# Patient Record
(demographics unavailable — no encounter records)

---

## 2024-10-23 NOTE — HEALTH RISK ASSESSMENT
[Good] : ~his/her~  mood as  good [No] : No [No falls in past year] : Patient reported no falls in the past year [Little interest or pleasure doing things] : 1) Little interest or pleasure doing things [Feeling down, depressed, or hopeless] : 2) Feeling down, depressed, or hopeless [0] : 2) Feeling down, depressed, or hopeless: Not at all (0) [PHQ-2 Negative - No further assessment needed] : PHQ-2 Negative - No further assessment needed [[id number]] : 0 [Never] : Never [HIV test declined] : HIV test declined [Hepatitis C test declined] : Hepatitis C test declined [MammogramDate] : due

## 2024-10-23 NOTE — HISTORY OF PRESENT ILLNESS
[FreeTextEntry1] : for cpe [de-identified] : for cpe has had periods every 14 days now last 4 days no pain no nvd or palpitations needs new gyn now working and feels happy

## 2025-05-22 NOTE — PHYSICAL EXAM
[Chaperoned Physical Exam] : A chaperone was present in the examining room during all aspects of the physical examination. [MA] : MA [FreeTextEntry2] : Celi Heredia [Appropriately responsive] : appropriately responsive [Alert] : alert [No Acute Distress] : no acute distress [Regular Rate Rhythm] : regular rate rhythm [Soft] : soft [Non-tender] : non-tender [Non-distended] : non-distended [No HSM] : No HSM [No Lesions] : no lesions [No Mass] : no mass [Oriented x3] : oriented x3 [FreeTextEntry5] : Breathing comfortably on room air [Examination Of The Breasts] : a normal appearance [No Masses] : no breast masses were palpable [Labia Majora] : normal [Labia Minora] : normal [Normal] : normal [Uterine Adnexae] : normal

## 2025-05-22 NOTE — DISCUSSION/SUMMARY
[FreeTextEntry1] : # Health Maintenance - Cervical cancer screening: Pap smear obtained. - Breast cancer screening: Not indicated at this time. - Colon cancer screening: Not indicated at this time. - Osteoporosis screening: Not indicated at this time. - STI screening: Bloodwork obtained  Advised to use lubricant for intercourse and monitor menses.   RTO annual or PRN

## 2025-05-22 NOTE — HISTORY OF PRESENT ILLNESS
[FreeTextEntry1] : 36-year-old female with last menstrual period of May 1, 2025 -0-0-2 presenting for annual GYN visit.  Obstetric history is significant for a full-term vaginal delivery of a 7-1/2 pound female in both  and  she had no complications during pregnancy.  She reports that she is having hot flashes and experiencing vaginal dryness.  Her menses started when she was 12 years old and are monthly.  She did have a period earlier this year in January where she had multiple menses in the same month, however this is regulated.  She reports that her menses last 4 to 5 days with regular flow.  She has no other concerns today.  She is using natural family-planning for contraceptive purposes, and is not interested in birth control at this time.  Review of systems is negative. [Patient reported PAP Smear was normal] : Patient reported PAP Smear was normal [HIV Test offered] : HIV Test offered [Syphilis test offered] : Syphilis test offered [Gonorrhea test offered] : Gonorrhea test offered [Chlamydia test offered] : Chlamydia test offered [Trichomonas test offered] : Trichomonas test offered [HPV test offered] : HPV test offered [Hepatitis B test offered] : Hepatitis B test offered [Hepatitis C test offered] : Hepatitis C test offered [N] : Patient does not use contraception [Y] : Positive pregnancy history [TextBox_19] : Not indicated at this time. [TextBox_25] : Not indicated at this time. [PapSmeardate] : 2024 [TextBox_37] : Not indicated at this time. [TextBox_43] : Not indicated at this time. [PGHxTotal] : 2 [[facility] : 2 [[facility] : 2